# Patient Record
Sex: MALE | Race: OTHER | NOT HISPANIC OR LATINO | URBAN - METROPOLITAN AREA
[De-identification: names, ages, dates, MRNs, and addresses within clinical notes are randomized per-mention and may not be internally consistent; named-entity substitution may affect disease eponyms.]

---

## 2022-02-19 ENCOUNTER — EMERGENCY (EMERGENCY)
Facility: HOSPITAL | Age: 47
LOS: 1 days | Discharge: LEFT BEFORE DISCHARGE | End: 2022-02-19
Attending: EMERGENCY MEDICINE | Admitting: HOSPITALIST
Payer: COMMERCIAL

## 2022-02-19 VITALS
OXYGEN SATURATION: 91 % | TEMPERATURE: 97 F | RESPIRATION RATE: 20 BRPM | SYSTOLIC BLOOD PRESSURE: 162 MMHG | DIASTOLIC BLOOD PRESSURE: 94 MMHG | HEART RATE: 106 BPM

## 2022-02-19 DIAGNOSIS — E78.5 HYPERLIPIDEMIA, UNSPECIFIED: ICD-10-CM

## 2022-02-19 DIAGNOSIS — E11.9 TYPE 2 DIABETES MELLITUS WITHOUT COMPLICATIONS: ICD-10-CM

## 2022-02-19 DIAGNOSIS — R00.0 TACHYCARDIA, UNSPECIFIED: ICD-10-CM

## 2022-02-19 DIAGNOSIS — R10.9 UNSPECIFIED ABDOMINAL PAIN: ICD-10-CM

## 2022-02-19 DIAGNOSIS — J44.1 CHRONIC OBSTRUCTIVE PULMONARY DISEASE WITH (ACUTE) EXACERBATION: ICD-10-CM

## 2022-02-19 DIAGNOSIS — Z53.29 PROCEDURE AND TREATMENT NOT CARRIED OUT BECAUSE OF PATIENT'S DECISION FOR OTHER REASONS: ICD-10-CM

## 2022-02-19 DIAGNOSIS — F17.200 NICOTINE DEPENDENCE, UNSPECIFIED, UNCOMPLICATED: ICD-10-CM

## 2022-02-19 DIAGNOSIS — R09.02 HYPOXEMIA: ICD-10-CM

## 2022-02-19 DIAGNOSIS — I10 ESSENTIAL (PRIMARY) HYPERTENSION: ICD-10-CM

## 2022-02-19 DIAGNOSIS — Z20.822 CONTACT WITH AND (SUSPECTED) EXPOSURE TO COVID-19: ICD-10-CM

## 2022-02-19 DIAGNOSIS — Z79.84 LONG TERM (CURRENT) USE OF ORAL HYPOGLYCEMIC DRUGS: ICD-10-CM

## 2022-02-19 PROCEDURE — 99285 EMERGENCY DEPT VISIT HI MDM: CPT | Mod: 25

## 2022-02-19 PROCEDURE — 93010 ELECTROCARDIOGRAM REPORT: CPT

## 2022-02-19 PROCEDURE — 71045 X-RAY EXAM CHEST 1 VIEW: CPT | Mod: 26

## 2022-02-20 VITALS
OXYGEN SATURATION: 94 % | SYSTOLIC BLOOD PRESSURE: 148 MMHG | TEMPERATURE: 98 F | DIASTOLIC BLOOD PRESSURE: 70 MMHG | RESPIRATION RATE: 20 BRPM | HEART RATE: 92 BPM

## 2022-02-20 LAB
ALBUMIN SERPL ELPH-MCNC: 4.5 G/DL — SIGNIFICANT CHANGE UP (ref 3.3–5)
ALP SERPL-CCNC: 79 U/L — SIGNIFICANT CHANGE UP (ref 40–120)
ALT FLD-CCNC: 24 U/L — SIGNIFICANT CHANGE UP (ref 10–45)
ANION GAP SERPL CALC-SCNC: 10 MMOL/L — SIGNIFICANT CHANGE UP (ref 5–17)
APPEARANCE UR: CLEAR — SIGNIFICANT CHANGE UP
APTT BLD: 39 SEC — HIGH (ref 27.5–35.5)
AST SERPL-CCNC: 23 U/L — SIGNIFICANT CHANGE UP (ref 10–40)
B-OH-BUTYR SERPL-SCNC: 0.2 MMOL/L — SIGNIFICANT CHANGE UP
BASE EXCESS BLDV CALC-SCNC: 4.6 MMOL/L — HIGH (ref -2–3)
BASOPHILS # BLD AUTO: 0.09 K/UL — SIGNIFICANT CHANGE UP (ref 0–0.2)
BASOPHILS NFR BLD AUTO: 0.9 % — SIGNIFICANT CHANGE UP (ref 0–2)
BILIRUB SERPL-MCNC: 0.4 MG/DL — SIGNIFICANT CHANGE UP (ref 0.2–1.2)
BILIRUB UR-MCNC: NEGATIVE — SIGNIFICANT CHANGE UP
BUN SERPL-MCNC: 12 MG/DL — SIGNIFICANT CHANGE UP (ref 7–23)
CA-I SERPL-SCNC: 1.24 MMOL/L — SIGNIFICANT CHANGE UP (ref 1.15–1.33)
CALCIUM SERPL-MCNC: 9.5 MG/DL — SIGNIFICANT CHANGE UP (ref 8.4–10.5)
CHLORIDE SERPL-SCNC: 100 MMOL/L — SIGNIFICANT CHANGE UP (ref 96–108)
CK MB CFR SERPL CALC: 4.5 NG/ML — SIGNIFICANT CHANGE UP (ref 0–6.7)
CK SERPL-CCNC: 106 U/L — SIGNIFICANT CHANGE UP (ref 30–200)
CO2 BLDV-SCNC: 36 MMOL/L — HIGH (ref 22–26)
CO2 SERPL-SCNC: 29 MMOL/L — SIGNIFICANT CHANGE UP (ref 22–31)
COLOR SPEC: YELLOW — SIGNIFICANT CHANGE UP
CREAT SERPL-MCNC: 0.87 MG/DL — SIGNIFICANT CHANGE UP (ref 0.5–1.3)
D DIMER BLD IA.RAPID-MCNC: <150 NG/ML DDU — SIGNIFICANT CHANGE UP
DIFF PNL FLD: NEGATIVE — SIGNIFICANT CHANGE UP
EOSINOPHIL # BLD AUTO: 0.41 K/UL — SIGNIFICANT CHANGE UP (ref 0–0.5)
EOSINOPHIL NFR BLD AUTO: 4.1 % — SIGNIFICANT CHANGE UP (ref 0–6)
GAS PNL BLDV: 132 MMOL/L — LOW (ref 136–145)
GAS PNL BLDV: SIGNIFICANT CHANGE UP
GLUCOSE SERPL-MCNC: 85 MG/DL — SIGNIFICANT CHANGE UP (ref 70–99)
GLUCOSE UR QL: NEGATIVE — SIGNIFICANT CHANGE UP
HCO3 BLDV-SCNC: 34 MMOL/L — HIGH (ref 22–29)
HCT VFR BLD CALC: 59.3 % — CRITICAL HIGH (ref 39–50)
HGB BLD-MCNC: 19.5 G/DL — CRITICAL HIGH (ref 13–17)
IMM GRANULOCYTES NFR BLD AUTO: 0.4 % — SIGNIFICANT CHANGE UP (ref 0–1.5)
INR BLD: 1.03 — SIGNIFICANT CHANGE UP (ref 0.88–1.16)
KETONES UR-MCNC: NEGATIVE — SIGNIFICANT CHANGE UP
LACTATE SERPL-SCNC: 1.1 MMOL/L — SIGNIFICANT CHANGE UP (ref 0.5–2)
LEUKOCYTE ESTERASE UR-ACNC: NEGATIVE — SIGNIFICANT CHANGE UP
LIDOCAIN IGE QN: 31 U/L — SIGNIFICANT CHANGE UP (ref 7–60)
LYMPHOCYTES # BLD AUTO: 3.32 K/UL — HIGH (ref 1–3.3)
LYMPHOCYTES # BLD AUTO: 33.1 % — SIGNIFICANT CHANGE UP (ref 13–44)
MAGNESIUM SERPL-MCNC: 2.1 MG/DL — SIGNIFICANT CHANGE UP (ref 1.6–2.6)
MCHC RBC-ENTMCNC: 29.8 PG — SIGNIFICANT CHANGE UP (ref 27–34)
MCHC RBC-ENTMCNC: 32.9 GM/DL — SIGNIFICANT CHANGE UP (ref 32–36)
MCV RBC AUTO: 90.5 FL — SIGNIFICANT CHANGE UP (ref 80–100)
MONOCYTES # BLD AUTO: 1.04 K/UL — HIGH (ref 0–0.9)
MONOCYTES NFR BLD AUTO: 10.4 % — SIGNIFICANT CHANGE UP (ref 2–14)
NEUTROPHILS # BLD AUTO: 5.14 K/UL — SIGNIFICANT CHANGE UP (ref 1.8–7.4)
NEUTROPHILS NFR BLD AUTO: 51.1 % — SIGNIFICANT CHANGE UP (ref 43–77)
NITRITE UR-MCNC: NEGATIVE — SIGNIFICANT CHANGE UP
NRBC # BLD: 0 /100 WBCS — SIGNIFICANT CHANGE UP (ref 0–0)
NT-PROBNP SERPL-SCNC: 12 PG/ML — SIGNIFICANT CHANGE UP (ref 0–300)
PCO2 BLDV: 72 MMHG — HIGH (ref 42–55)
PH BLDV: 7.28 — LOW (ref 7.32–7.43)
PH UR: 6.5 — SIGNIFICANT CHANGE UP (ref 5–8)
PLATELET # BLD AUTO: 252 K/UL — SIGNIFICANT CHANGE UP (ref 150–400)
PO2 BLDV: <29 MMHG — LOW (ref 25–45)
POTASSIUM BLDV-SCNC: 4.1 MMOL/L — SIGNIFICANT CHANGE UP (ref 3.5–5.1)
POTASSIUM SERPL-MCNC: 4 MMOL/L — SIGNIFICANT CHANGE UP (ref 3.5–5.3)
POTASSIUM SERPL-SCNC: 4 MMOL/L — SIGNIFICANT CHANGE UP (ref 3.5–5.3)
PROT SERPL-MCNC: 7.9 G/DL — SIGNIFICANT CHANGE UP (ref 6–8.3)
PROT UR-MCNC: NEGATIVE MG/DL — SIGNIFICANT CHANGE UP
PROTHROM AB SERPL-ACNC: 12.3 SEC — SIGNIFICANT CHANGE UP (ref 10.6–13.6)
RBC # BLD: 6.55 M/UL — HIGH (ref 4.2–5.8)
RBC # FLD: 14.3 % — SIGNIFICANT CHANGE UP (ref 10.3–14.5)
SAO2 % BLDV: 36.5 % — LOW (ref 67–88)
SARS-COV-2 RNA SPEC QL NAA+PROBE: NEGATIVE — SIGNIFICANT CHANGE UP
SODIUM SERPL-SCNC: 139 MMOL/L — SIGNIFICANT CHANGE UP (ref 135–145)
SP GR SPEC: 1.01 — SIGNIFICANT CHANGE UP (ref 1–1.03)
TROPONIN T SERPL-MCNC: <0.01 NG/ML — SIGNIFICANT CHANGE UP (ref 0–0.01)
UROBILINOGEN FLD QL: 0.2 E.U./DL — SIGNIFICANT CHANGE UP
WBC # BLD: 10.04 K/UL — SIGNIFICANT CHANGE UP (ref 3.8–10.5)
WBC # FLD AUTO: 10.04 K/UL — SIGNIFICANT CHANGE UP (ref 3.8–10.5)

## 2022-02-20 PROCEDURE — 87040 BLOOD CULTURE FOR BACTERIA: CPT

## 2022-02-20 PROCEDURE — 82962 GLUCOSE BLOOD TEST: CPT

## 2022-02-20 PROCEDURE — 96374 THER/PROPH/DIAG INJ IV PUSH: CPT

## 2022-02-20 PROCEDURE — 80053 COMPREHEN METABOLIC PANEL: CPT

## 2022-02-20 PROCEDURE — 82803 BLOOD GASES ANY COMBINATION: CPT

## 2022-02-20 PROCEDURE — G1004: CPT

## 2022-02-20 PROCEDURE — 71275 CT ANGIOGRAPHY CHEST: CPT | Mod: 26,MG

## 2022-02-20 PROCEDURE — 74177 CT ABD & PELVIS W/CONTRAST: CPT | Mod: 26,ME,59

## 2022-02-20 PROCEDURE — 84484 ASSAY OF TROPONIN QUANT: CPT

## 2022-02-20 PROCEDURE — 82330 ASSAY OF CALCIUM: CPT

## 2022-02-20 PROCEDURE — 84132 ASSAY OF SERUM POTASSIUM: CPT

## 2022-02-20 PROCEDURE — 36415 COLL VENOUS BLD VENIPUNCTURE: CPT

## 2022-02-20 PROCEDURE — 82553 CREATINE MB FRACTION: CPT

## 2022-02-20 PROCEDURE — 99285 EMERGENCY DEPT VISIT HI MDM: CPT | Mod: 25

## 2022-02-20 PROCEDURE — 74177 CT ABD & PELVIS W/CONTRAST: CPT | Mod: ME

## 2022-02-20 PROCEDURE — 71045 X-RAY EXAM CHEST 1 VIEW: CPT

## 2022-02-20 PROCEDURE — 82010 KETONE BODYS QUAN: CPT

## 2022-02-20 PROCEDURE — 71045 X-RAY EXAM CHEST 1 VIEW: CPT | Mod: 26

## 2022-02-20 PROCEDURE — 83690 ASSAY OF LIPASE: CPT

## 2022-02-20 PROCEDURE — 85379 FIBRIN DEGRADATION QUANT: CPT

## 2022-02-20 PROCEDURE — 84295 ASSAY OF SERUM SODIUM: CPT

## 2022-02-20 PROCEDURE — 82550 ASSAY OF CK (CPK): CPT

## 2022-02-20 PROCEDURE — 85025 COMPLETE CBC W/AUTO DIFF WBC: CPT

## 2022-02-20 PROCEDURE — 96375 TX/PRO/DX INJ NEW DRUG ADDON: CPT

## 2022-02-20 PROCEDURE — 93005 ELECTROCARDIOGRAM TRACING: CPT

## 2022-02-20 PROCEDURE — 83605 ASSAY OF LACTIC ACID: CPT

## 2022-02-20 PROCEDURE — 87635 SARS-COV-2 COVID-19 AMP PRB: CPT

## 2022-02-20 PROCEDURE — 94640 AIRWAY INHALATION TREATMENT: CPT

## 2022-02-20 PROCEDURE — 83880 ASSAY OF NATRIURETIC PEPTIDE: CPT

## 2022-02-20 PROCEDURE — 85610 PROTHROMBIN TIME: CPT

## 2022-02-20 PROCEDURE — 71275 CT ANGIOGRAPHY CHEST: CPT | Mod: MG

## 2022-02-20 PROCEDURE — 81003 URINALYSIS AUTO W/O SCOPE: CPT

## 2022-02-20 PROCEDURE — 85730 THROMBOPLASTIN TIME PARTIAL: CPT

## 2022-02-20 PROCEDURE — 83735 ASSAY OF MAGNESIUM: CPT

## 2022-02-20 RX ORDER — IPRATROPIUM/ALBUTEROL SULFATE 18-103MCG
3 AEROSOL WITH ADAPTER (GRAM) INHALATION ONCE
Refills: 0 | Status: COMPLETED | OUTPATIENT
Start: 2022-02-20 | End: 2022-02-20

## 2022-02-20 RX ORDER — ALBUTEROL 90 UG/1
3 AEROSOL, METERED ORAL
Qty: 25 | Refills: 0
Start: 2022-02-20 | End: 2022-03-21

## 2022-02-20 RX ORDER — MORPHINE SULFATE 50 MG/1
2 CAPSULE, EXTENDED RELEASE ORAL ONCE
Refills: 0 | Status: DISCONTINUED | OUTPATIENT
Start: 2022-02-20 | End: 2022-02-20

## 2022-02-20 RX ADMIN — MORPHINE SULFATE 2 MILLIGRAM(S): 50 CAPSULE, EXTENDED RELEASE ORAL at 03:20

## 2022-02-20 RX ADMIN — Medication 3 MILLILITER(S): at 04:47

## 2022-02-20 RX ADMIN — Medication 125 MILLIGRAM(S): at 04:45

## 2022-02-20 NOTE — ED PROVIDER NOTE - PATIENT PORTAL LINK FT
You can access the FollowMyHealth Patient Portal offered by [facility] by registering at the following website: [email or web address]. By joining [facility]’s FollowMyHealth portal, you will also be able to view your health information using other applications (apps) compatible with our system.

## 2022-02-20 NOTE — ED PROVIDER NOTE - NS ED ROS FT
CONSTITUTIONAL: HPI  NEURO: No headache, no dizziness, no syncope; No focal weakness/tingling/numbness  EYES: No visual changes  ENT: No rhinorrhea or sore throat  PULM: Minimal cough, no hemoptysis  CV: No chest pain or palpitations  GI: HPIVomited 2 days ago. No blood in stool or melena  : No dysuria, hematuria, frequency  MSK: Ankle swelling for few months  SKIN: no rash or unusual bruising

## 2022-02-20 NOTE — ED PROVIDER NOTE - CARE PROVIDER_API CALL
Anthony Tobias)  Critical Care Medicine; Internal Medicine; Pulmonary Disease  [address] [place] [address]  Phone: [phone]  Fax: [phone]  Follow Up Time:

## 2022-02-20 NOTE — ED PROVIDER NOTE - NSFOLLOWUPINSTRUCTIONS_ED_ALL_ED_FT
No more smoking.  Take prednisone as prescribed for next 5 days.  Use the albuterol via nebulizer every 4 hours as needed for breathing problems.   Follow up with your physician as soon as possible.  You will also need to see a lung specialist.   Return to the Emergency Department if you change your mind about leaving, if you have any new or worsening symptoms, or if you have any concerns.  ======================    Emphysema    WHAT YOU NEED TO KNOW:    Emphysema is a long-term lung disease that is most commonly caused by smoking. It is part of a group of lung diseases called chronic obstructive pulmonary disease (COPD). Emphysema damages the alveoli (air sacs) in your lungs. This makes it hard for your lungs to send oxygen to the rest of your body.     The Lungs         DISCHARGE INSTRUCTIONS:    Call 911 if:   •You have pain, pressure, or fullness in your chest that lasts more than a few minutes or returns.       •You have pain or discomfort in your back, neck, jaw, stomach, or arm.       Return to the emergency department if:   •You have shortness of breath that is so severe you cannot talk.       •You have a sudden cold sweat.       •You cough up blood.       •You are confused, dizzy, or feel like you may pass out.       Contact your healthcare provider if:   •You have a fever.       •You have trouble doing your usual activities because it is hard to breathe.       •You need to use your inhalers or take breathing treatments more often than usual.       •You cough up more sputum than is normal for you.       •You wheeze more than is normal for you.       •Your legs or ankles are swollen.       •You have questions or concerns about your condition or care.       Do not smoke: If you smoke, it is never too late to quit. Quitting smoking will improve your health and the health of those around you. If you smoke, ask for information about how to stop.    Medicines:   •Bronchodilators help open your airway so you can breathe better. They are most often taken through one of the following devices: ?An inhaler is a handheld device that delivers medicine that you breathe in.      ?A nebulizer is a machine that turns liquid medicine into mist that you breathe in through a mouthpiece.      •Steroids decrease swelling in your lungs. They may be inhaled or taken as a pill.       •Take your medicine as directed. Contact your healthcare provider if you think your medicine is not helping or if you have side effects. Tell him or her if you are allergic to any medicine. Keep a list of the medicines, vitamins, and herbs you take. Include the amounts, and when and why you take them. Bring the list or the pill bottles to follow-up visits. Carry your medicine list with you in case of an emergency.      Follow up with your healthcare provider or pulmonologist as directed: Write down your questions so you remember to ask them during your visits.     Emphysema exacerbation: An exacerbation is when your symptoms suddenly get worse. You may have a harder time breathing, your cough may get worse, and you may cough up more sputum. You may have a fever, increased heart rate, or feel sleepy. An exacerbation may be caused by a lung infection, air pollution, or other lung irritants. Sometimes the cause of an exacerbation is unknown. Your healthcare provider or pulmonologist may change your treatment to help relieve exacerbations.     Manage your emphysema and help prevent an exacerbation:   •Avoid irritants. Wear protective gear if your workplace has dust or chemicals that bother you. Stay inside when air quality is bad.       •Seek treatment. Get early treatment if your symptoms are getting worse. This may help you recover faster. Know what to do in case of an exacerbation.       •Exercise daily. Talk to your healthcare provider about the best exercise plan for you. Exercise can help decrease breathing problems and improve your health.       •Use pursed-lip breathing. Pursed-lip breathing can be used any time you feel short of breath. It can be especially helpful before you start an activity. ?Count to 2 while you take a deep breath in through your nose.      ?Slowly breathe out through your mouth with your lips slightly puckered. You should make a quiet hissing sound as you breathe out.      ?Repeat this exercise 4 or 5 times a day Once you are used to doing pursed-lip breathing, you can use it any time you need more air.    Breathe in Breathe out         •Use sleep positions that help you breathe better. Sleep with your upper body raised if you have trouble breathing when you lie down. Use foam wedges or elevate the head of your bed. Use a device that will tilt your whole body, or bend your body at the waist. The device should not bend your body at the upper back or neck. You may sleep better in a recliner.       •Ask your healthcare provider about the flu and pneumonia vaccines. All adults should get the flu (influenza) vaccine as soon as recommended each year, usually in September or October. The pneumonia vaccine is given to adults aged 65 or older to prevent pneumococcal disease, such as pneumonia. Adults aged 19 to 64 years who are at high risk for pneumococcal disease also should get the pneumococcal vaccine. It may need to be repeated 1 or 5 years later.

## 2022-02-20 NOTE — ED PROVIDER NOTE - PROGRESS NOTE DETAILS
Pt refusing to be admitted to the hospital.  Medical resident and I had extensive discussion with patient about the risks he is taking by refusing admission, including worsening breathing problems, worsening hypoxia "dangerously low oxygen level" and even potential death.  Pt says he is willing to be take these risks and says "I am responsible for myself", and says he will come back to the hospital if he gets worse.  He insists on signing out against medical advice.  He says he has a primary care physician for follow up, and he was advised that he will need to follow up with a pulmonologist.  Rx for steroid and albuterol sent to pharmacy.  Paper Rx for nebulizer machine provided, and pt was given nebulizer accessories to take home.

## 2022-02-20 NOTE — ED PROVIDER NOTE - ATTENDING CONTRIBUTION TO CARE
here with right abdomianl/flank pain / tightness radiating around abdomen x 3 days. assoc with sob/ orthopnea. no chest pain. no vomiting. taking a few advil without relief. Denies alcohol/drug use. abdomen soft, no focal tenderness. noted to be hypoxic and tachycardic on vitals. labs/cta/ct a/p ordered to eval infection, pe, acs, chf, mass. signed out to Dr. Connor pending results. likely admit

## 2022-02-20 NOTE — ED PROVIDER NOTE - ADMIT DISPOSITION PRESENT ON ADMISSION SEPSIS
Quality 111:Pneumonia Vaccination Status For Older Adults: Pneumococcal Vaccination not Administered or Previously Received, Reason not Otherwise Specified Quality 110: Preventive Care And Screening: Influenza Immunization: Influenza Immunization not Administered because Patient Refused. Detail Level: Detailed No

## 2022-02-20 NOTE — ED PROVIDER NOTE - CLINICAL SUMMARY MEDICAL DECISION MAKING FREE TEXT BOX
Dyspnea and abd pain/distention.  Hx HTN HLD DM.  Tachypneic and tachycardic, hypoxia on room air corrected with nasal cannula oxygen.  No significnt electrolyte abnormalities.  Elevated H/H, severe emphysema on CT scan, mild resp acidosis - probably chronic based on blood gas. Unclear cause of abd pain.

## 2022-02-20 NOTE — ED PROVIDER NOTE - PHYSICAL EXAMINATION
CONSTITUTIONAL: Alert NAD  SKIN: Normal color and turgor.    HEAD: NC/AT.  EYES: Conjunctiva clear. EOMI. PERRL.    ENT: Airway clear. Normal voice.   RESPIRATORY:  Mildly tachypneic, mild retractions.  CTAB.  Room air O2 sat 89-90% - improved to 96% on oxygen at 3 LPM.  CARDIOVASCULAR:  RRR, S1S2. No M/R/G.      GI:  NABS.  Abd mildly distended, mild epigastric tenderness noted. No G/R.  MSK: Neck supple.  Trace ankle edema. No muscular tenderness. No joint swelling or ROM limitation.  NEURO: Alert; CN: grossly intact. Speech clear.  CASTILLO. Ambulating well.

## 2022-02-20 NOTE — ED ADULT NURSE REASSESSMENT NOTE - NS ED NURSE REASSESS COMMENT FT1
pt ambulatory to stretcher, more sob with exertion.   pulse ox 88%.   SEEMA Adams in to see pt.   o2 2 liters nc applied.

## 2022-02-20 NOTE — ED PROVIDER NOTE - OBJECTIVE STATEMENT
46 m PMHx DM2, HTN, HLD  c/o 3 days abdominal discomfort/pain described as "tightness" around midabdomen radiating bilaterally to flanks.  Also feeling short of breath for the same duration.  No chest pain or palpitations.  Felt feverish earlier but has not taken temp, took NSAID before coming to ED.  No hx of heart disease, does note some ankle swelling for past several months.    PMHx DM, HTN, HLD  PSHx right inguinal hernia repair  Meds metformin,  NKDA  Social hx: former alcohol use, no alcohol in about a year. 46 m PMHx DM2, HTN, HLD  c/o 3 days abdominal discomfort/pain described as "tightness" around midabdomen radiating bilaterally to flanks.  Also feeling short of breath for the same duration.  No chest pain or palpitations.  Felt feverish earlier but has not taken temp, took NSAID before coming to ED.  No hx of heart disease, does note some ankle swelling for past several months.    PMHx DM, HTN, HLD  Former daily smoker, still smokes some days  PSHx right inguinal hernia repair  Meds metformin,  NKDA  Social hx: former alcohol use, no alcohol in about a year.

## 2022-02-20 NOTE — ED ADULT NURSE REASSESSMENT NOTE - NS ED NURSE REASSESS COMMENT FT1
Pt signed AMA, physician and admitting MD at bedside, pt signed document, pt aware of risks of leaving hospital . PT denies chest pain, sob, nausea, vomiting, fever

## 2022-02-25 LAB
CULTURE RESULTS: SIGNIFICANT CHANGE UP
CULTURE RESULTS: SIGNIFICANT CHANGE UP
SPECIMEN SOURCE: SIGNIFICANT CHANGE UP
SPECIMEN SOURCE: SIGNIFICANT CHANGE UP